# Patient Record
Sex: MALE | Race: WHITE | Employment: FULL TIME | URBAN - METROPOLITAN AREA
[De-identification: names, ages, dates, MRNs, and addresses within clinical notes are randomized per-mention and may not be internally consistent; named-entity substitution may affect disease eponyms.]

---

## 2020-05-18 ENCOUNTER — HOSPITAL ENCOUNTER (EMERGENCY)
Age: 40
Discharge: HOME | End: 2020-05-18
Attending: EMERGENCY MEDICINE
Payer: SELF-PAY

## 2020-05-18 VITALS
RESPIRATION RATE: 12 BRPM | TEMPERATURE: 97.8 F | OXYGEN SATURATION: 99 % | HEART RATE: 82 BPM | SYSTOLIC BLOOD PRESSURE: 168 MMHG | DIASTOLIC BLOOD PRESSURE: 99 MMHG

## 2020-05-18 DIAGNOSIS — F11.10 HEROIN ABUSE (HCC): ICD-10-CM

## 2020-05-18 DIAGNOSIS — T50.904A DRUG OVERDOSE, UNDETERMINED INTENT, INITIAL ENCOUNTER: Primary | ICD-10-CM

## 2020-05-18 LAB
AMPHET UR QL SCN: POSITIVE
BARBITURATES UR QL SCN: NEGATIVE
BENZODIAZ UR QL: POSITIVE
CANNABINOIDS UR QL SCN: POSITIVE
COCAINE UR QL SCN: NEGATIVE
DRUG SCRN COMMENT,DRGCM: ABNORMAL
GLUCOSE BLD STRIP.AUTO-MCNC: 99 MG/DL (ref 65–100)
METHADONE UR QL: NEGATIVE
OPIATES UR QL: NEGATIVE
PCP UR QL: NEGATIVE
SERVICE CMNT-IMP: NORMAL

## 2020-05-18 PROCEDURE — 94640 AIRWAY INHALATION TREATMENT: CPT

## 2020-05-18 PROCEDURE — 82962 GLUCOSE BLOOD TEST: CPT

## 2020-05-18 PROCEDURE — 74011000250 HC RX REV CODE- 250: Performed by: EMERGENCY MEDICINE

## 2020-05-18 PROCEDURE — 74011250636 HC RX REV CODE- 250/636: Performed by: EMERGENCY MEDICINE

## 2020-05-18 PROCEDURE — 99285 EMERGENCY DEPT VISIT HI MDM: CPT

## 2020-05-18 PROCEDURE — 80307 DRUG TEST PRSMV CHEM ANLYZR: CPT

## 2020-05-18 RX ORDER — NALOXONE HYDROCHLORIDE 4 MG/.1ML
SPRAY NASAL
Qty: 1 EACH | Refills: 0 | Status: SHIPPED | OUTPATIENT
Start: 2020-05-18

## 2020-05-18 RX ORDER — NALOXONE HYDROCHLORIDE 1 MG/ML
1 INJECTION INTRAMUSCULAR; INTRAVENOUS; SUBCUTANEOUS
Status: COMPLETED | OUTPATIENT
Start: 2020-05-18 | End: 2020-05-18

## 2020-05-18 RX ORDER — NALOXONE HYDROCHLORIDE 1 MG/ML
0.5 INJECTION INTRAMUSCULAR; INTRAVENOUS; SUBCUTANEOUS
Status: DISCONTINUED | OUTPATIENT
Start: 2020-05-18 | End: 2020-05-18

## 2020-05-18 RX ORDER — AMMONIA 15 % (W/V)
1 AMPUL (EA) INHALATION
Status: COMPLETED | OUTPATIENT
Start: 2020-05-18 | End: 2020-05-18

## 2020-05-18 RX ADMIN — AMMONIA INHALANTS 1 AMPULE: 0.04 INHALANT RESPIRATORY (INHALATION) at 15:07

## 2020-05-18 RX ADMIN — NALOXONE HYDROCHLORIDE 1 MG: 1 INJECTION PARENTERAL at 14:27

## 2020-05-18 NOTE — ED NOTES
Pt noted to be speaking in short sentences with [facility] police at bedside. Police report pt will go to senior care after discharge.

## 2020-05-18 NOTE — ED NOTES
Tech attempted to ambulate pt at MD request. Pt noted to be unsteady on his feet, unable to ambulate without assistance. Pt reports his legs feel like jello. Pt assisted back to bed and provided with sandwich. MD made aware.

## 2020-05-18 NOTE — DISCHARGE INSTRUCTIONS
Patient Education        Opioid Overdose: Care Instructions  Your Care Instructions    You have had treatment to help your body recover from taking too much of an opioid. You are getting better, but you may not feel well for a while. It takes time for the opioids to leave your body. How long it takes to feel better depends on which drug you took and how much you took of it. Opioids include illegal drugs such as heroin, often called smack, junk, H, and ska. Opioids also include medicines that doctors prescribe to treat pain. These are medicines such as oxycodone, methadone, and buprenorphine. They are sometimes sold and used illegally. Taking too much of an opioid can be dangerous. It may cause:  · Trouble breathing. · Low blood pressure. · A low heart rate. · A coma. When the doctor treated you for the overdose, he or she may have:  · Watched your symptoms or done tests to find out what kind of drug you took. · Given you fluids. · Given you oxygen to help you breathe. · Given you a medicine called naloxone to help reverse the effects of the opioid. · Done several tests, including blood tests, to see how you're responding to treatment. The doctor also watched you carefully to make sure you were recovering safely. Follow-up care is a key part of your treatment and safety. Be sure to make and go to all appointments, and call your doctor if you are having problems. It's also a good idea to know your test results and keep a list of the medicines you take. How can you care for yourself at home? · If you take opioids regularly, your body gets used to them. This is called physical dependence. If you are physically dependent on opioids, you may have withdrawal symptoms when you stop using them or use less. These symptoms can include nausea, sweating, chills, diarrhea, stomach cramps, and muscle aches. Withdrawal can last up to several weeks, depending on which opioid you took and how long you took it.  You may feel very ill, but you probably aren't in medical danger. · Your doctor may give you medicine to help you feel better. To help you get through withdrawal, you can also:  ? Get plenty of rest.  ? Drink plenty of fluids. ? Stay active. ? Eat a healthy diet. · If you had a tube in your throat to help you breathe, you may have a sore throat or hoarseness that can last a few days. Sip liquids to help soothe your throat. · Do not drink alcohol or take illegal drugs. · Do not take medicines that make you tired, like sleeping pills or muscle relaxers. · Do not drive if you feel sleepy or groggy while you recover from an overdose. · Get help to stop using opioids. Talk to your doctor about substance use treatment programs. · Talk to your doctor or pharmacist about having a naloxone rescue kit on hand. When should you call for help? Call 911 anytime you think you may need emergency care. For example, call if:    · You feel you cannot stop from hurting yourself or someone else.   [facility] your doctor now or seek immediate medical care if:    · You have new or worse withdrawal symptoms, such as:  ? Stomach cramps. ? Vomiting. ? Diarrhea. ? Muscle aches. ? Sweating.    Watch closely for changes in your health, and be sure to contact your doctor if:    · You do not get better as expected. Where can you learn more? Go to http://shun-willard.info/  Enter Z171 in the search box to learn more about \"Opioid Overdose: Care Instructions. \"  Current as of: August 21, 2019Content Version: 12.4  © 5549-2051 Healthwise, Incorporated. Care instructions adapted under license by [employer] (which disclaims liability or warranty for this information). If you have questions about a medical condition or this instruction, always ask your healthcare professional. [address] any warranty or liability for your use of this information.          Patient Education        Use of Multiple Drugs: Care Instructions  Your Care Instructions    You have had treatment to help your body get rid of a combination of any of these drugs:  · Prescription medicines  · Over-the-counter medicines  · Alcohol  · Illegal drugs  Taking some drugs together may cause a bad reaction. They can have unexpected or stronger effects on your body and mind. For example, benzodiazepines (such as alprazolam and lorazepam) and alcohol both depress the nervous system. Taken together, each one is stronger than when it is taken by itself. You are getting better, but it takes time for the drugs to leave your body. It may take up to 2 weeks for your mind to clear and your mood to improve. Depending on the drugs you took, the doctor might have:  · Watched your symptoms or done tests to find out what drugs were in your body. · Treated you to control your breathing, blood pressure, and heart rate. · Tried to remove the drugs from your body by pumping your stomach or giving you a substance by mouth that absorbs chemicals. · Given you a substance that neutralizes chemicals (antidote). · Given you oxygen to help you breathe. · Given you fluids. The doctor also watched you carefully to make sure you were recovering safely. Follow-up care is a key part of your treatment and safety. Be sure to make and go to all appointments, and call your doctor if you are having problems. It's also a good idea to know your test results and keep a list of the medicines you take. How can you care for yourself at home? · Adopt healthy habits to ease withdrawal symptoms. When you use substances like alcohol and some drugs regularly, your body gets used to them. This is called physical dependence. If you are physically dependent on substances, you may have withdrawal symptoms when you stop taking them. These symptoms may include trembling, feeling restless, and sweating.  To help get past these:  ? Get plenty of rest.  ? Drink lots of fluids. ? Stay active. ? Eat a healthy diet. · Drink fluids to soothe a sore throat. If you had a tube in your throat to help you breathe, you may have a sore throat or hoarseness that can last a few days. Drinking fluids may help. · If you use opioids, ask your doctor or pharmacist about having a naloxone rescue kit on hand. · Get help to stop using drugs. Talk to your doctor about substance use treatment programs. When should you call for help? Call 911 anytime you think you may need emergency care. For example, call if:    · You feel you cannot stop from hurting yourself or someone else.   [facility] your doctor now or seek immediate medical care if:    · You have new or worse symptoms of withdrawal, such as trembling, feeling restless, and sweating, that you can't manage at home.    Watch closely for changes in your health, and be sure to contact your doctor if:    · You do not get better as expected.     · You need help finding the right place to get help with drug or alcohol problems. Where can you learn more? Go to http://shunSierra Atlanticwillard.info/  Enter B109 in the search box to learn more about \"Use of Multiple Drugs: Care Instructions. \"  Current as of: August 21, 2019Content Version: 12.4  © 2913-4546 Healthwise, Incorporated. Care instructions adapted under license by [employer] (which disclaims liability or warranty for this information). If you have questions about a medical condition or this instruction, always ask your healthcare professional. [place] [address] any warranty or liability for your use of this information. Patient Education        Learning About Naloxone for Opioid Overdose  What is naloxone? Opioids are strong pain medicines. Examples include hydrocodone, oxycodone, fentanyl, and morphine. Heroin is an example of an illegal opioid. Taking too much of an opioid can cause death. An overdose is an emergency.   Naloxone is a medicine that reverses the effects of an overdose. If you take it soon enough after an overdose, it can save your life. Naloxone comes in a rescue kit you can carry with you. You may hear it called a Narcan kit for short. The rescue kit may contain:  · The medicine. · Syringes and needles. · A nasal adapter for the syringes. · A separate nasal spray device. Your doctor can give you a prescription for a rescue kit and show you how to use it. In some places you can buy Narcan kits without a prescription. When is naloxone used? Naloxone is used when a person shows signs of an opioid overdose. A person may have overdosed if he or she is:  · Sleepy or hard to wake up. · Confused. · Not breathing normally. Make sure your family and friends know about these signs of an overdose. If someone appears to have overdosed, call 911. A drug overdose is an emergency. How do you use it? If you overdose, you may not be able to give yourself the medicine. So it's very important that your friends and family know how and when to give it to you. Rescue kits come with instructions. There are two ways to give the medicine. Many rescue kits can be used for either method. The methods are:  · Injection with needle and syringe. ? Training may be needed in order to use this method correctly. ? Some rescue kits come with automatic injectors that don't require special training. ? The medicine can be injected through clothing. · Nasal spray. ? Many rescue kits come with a nasal adapter. It attaches to the syringe and turns the medicine into a mist.  ? The mist is sprayed into the nose of a person who has overdosed. The person needs to be lying down when the mist is sprayed. Overdose symptoms may return a few minutes after the first dose from the rescue kit. If that happens, a second dose is needed. Rescue kits come with two doses for that reason. Keep your rescue kit with you always. You never know when you might need it.   If you think you or someone else may have overdosed but you're not sure, use the kit anyway. Aside from going through withdrawal, which may be uncomfortable, there is no downside to using this medicine. Always go to the emergency room after using naloxone. Doctors will want to make sure the overdose has been reversed. Follow-up care is a key part of your treatment and safety. Be sure to make and go to all appointments, and call your doctor if you are having problems. It's also a good idea to know your test results and keep a list of the medicines you take. Where can you learn more? Go to http://shun-willard.info/  Enter Q404 in the search box to learn more about \"Learning About Naloxone for Opioid Overdose. \"  Current as of: August 21, 2019Content Version: 12.4  © 4946-9647 Healthwise, Incorporated. Care instructions adapted under license by [employer] (which disclaims liability or warranty for this information). If you have questions about a medical condition or this instruction, always ask your healthcare professional. [address] any warranty or liability for your use of this information.

## 2020-05-18 NOTE — ED NOTES
Bedside and Verbal shift change report given to Yamini Brock RN (oncoming nurse) by Hailey Ross RN (offgoing nurse). Report included the following information SBAR and ED Summary.

## 2020-05-18 NOTE — ED NOTES
Patient (s)  given copy of dc instructions and 1 paper script(s) and 0 electronic scripts. Patient (s)  verbalized understanding of instructions and script (s). Patient given a current medication reconciliation form and verbalized understanding of their medications. Patient (s) verbalized understanding of the importance of discussing medications with  his or her physician or clinic they will be following up with. Patient alert and oriented and in no acute distress. Patient offered wheelchair from treatment area to hospital entrance, patient denies wheelchair.

## 2020-05-18 NOTE — ED NOTES
Pt presents to ED via EMS for possible drug overdose. Per EMS, pt was found slumped over in the 's seat to a pickup truck. EMS report finding a bottle of 4mg Xanax pills and a rolled up dollar bill next to him. Police also found an unknown white powder, smoking pipes, and needles. Pt noted to be drowsy, difficult to arouse. Pt able to maintain own airway. Pt BP noted to be elevated, unknown if pt has hx of HTN. Pt able to answer yes/no questions or provide short answers before falling back asleep. Call bell in reach. Side rails x2      Emergency Department Nursing Plan of Care       The Nursing Plan of Care is developed from the Nursing assessment and Emergency Department Attending provider initial evaluation. The plan of care may be reviewed in the ED Provider note.     The Plan of Care was developed with the following considerations:   Patient / Family readiness to learn indicated by:verbalized understanding  Persons(s) to be included in education: police  Barriers to Learning/Limitations:No    Signed     Srinivas Collazo RN    5/18/2020   3:38 PM

## 2020-05-18 NOTE — ED NOTES
After using ammonia capsule, pt appeared to be more awake. Pt reports hx of drug abuse including meth, opiates, and xanax. Pt maintains that the only drugs he did today was xanax.

## 2020-05-18 NOTE — ED PROVIDER NOTES
EMERGENCY DEPARTMENT HISTORY AND PHYSICAL EXAM      Date: 5/18/2020  Patient Name: Carson Friend    History of Presenting Illness     Chief Complaint   Patient presents with    Drug Overdose     History Provided By: Patient and EMS    HPI: Carson Firend, 47 y.o. male with past medical history significant for GERD and migraines who presents via EMS to the ED with cc of overdose. Per police and EMS, patient was found in the front seat of a vehicle with a female passenger in the back minimally responsive. They found multiple hypodermic needles as Xanax tablets in the car. Patient is a known IV heroin user and denies taking any drugs today. He denies any current complaints. PMHx: GERD and migraines  Social Hx: Unknown    PCP: None    History and review of systems limited secondary to patient's level of alertness. No current facility-administered medications on file prior to encounter. Current Outpatient Medications on File Prior to Encounter   Medication Sig Dispense Refill    [DISCONTINUED] ibuprofen (MOTRIN) 600 mg tablet Take 1 Tab by mouth every six (6) hours as needed for Pain. 20 Tab 0    [DISCONTINUED] ondansetron hcl (ZOFRAN) 4 mg tablet Take 1 Tab by mouth every eight (8) hours as needed for Nausea. 20 Tab 0     Past History     Past Medical History:  Past Medical History:   Diagnosis Date    Gastrointestinal disorder     Stomach Ulcers    Ill-defined condition     Migranes     Past Surgical History:  Past Surgical History:   Procedure Laterality Date    HX HEENT      Nasal Septum    HX ORTHOPAEDIC      Exploratory Surgery on R Knee     Family History:  History reviewed. No pertinent family history. Social History:  Social History     Tobacco Use    Smoking status: Current Every Day Smoker     Packs/day: 0.50    Smokeless tobacco: Never Used   Substance Use Topics    Alcohol use: Not Currently    Drug use: Yes     Allergies:   Allergies   Allergen Reactions    Acetaminophen Shortness of Breath and Swelling     Review of Systems   Review of Systems   Unable to perform ROS: Acuity of condition     Physical Exam   Physical Exam  Vitals signs and nursing note reviewed. Constitutional:       Appearance: Normal appearance. He is well-developed. Comments: Arousable but drowsy   HENT:      Head: Normocephalic and atraumatic. Neck:      Musculoskeletal: Full passive range of motion without pain, normal range of motion and neck supple. Cardiovascular:      Rate and Rhythm: Normal rate and regular rhythm. Pulses: Normal pulses. Heart sounds: Normal heart sounds. No murmur. Pulmonary:      Effort: Pulmonary effort is normal. No respiratory distress. Breath sounds: Normal breath sounds. Comments: Protecting his own airway  Chest:      Chest wall: No tenderness. Abdominal:      General: Bowel sounds are normal.      Palpations: Abdomen is soft. Tenderness: There is no abdominal tenderness. There is no guarding or rebound. Skin:     General: Skin is warm and dry. Findings: No erythema or rash. Neurological:      Mental Status: He is lethargic. Comments: Drowsy but arousable, moves all extremities spontaneously, minimally answers questions and then falls back asleep   Psychiatric:         Speech: Speech normal.         Behavior: Behavior normal.         Thought Content: Thought content normal.         Judgment: Judgment normal.       Diagnostic Study Results   Labs -     Recent Results (from the past 12 hour(s))   GLUCOSE, POC    Collection Time: 05/18/20  1:47 PM   Result Value Ref Range    Glucose (POC) 99 65 - 100 mg/dL    Performed by [facility] EDT        Radiologic Studies -   No orders to display     No results found. Medical Decision Making   I am the first provider for this patient. I reviewed the vital signs, available nursing notes, past medical history, past surgical history, family history and social history.     Vital Signs-Reviewed the patient's vital signs. Patient Vitals for the past 24 hrs:   Temp Pulse Resp BP SpO2   05/18/20 1500  81 13 (!) 176/113 99 %   05/18/20 1430  78 19 (!) 188/119 99 %   05/18/20 1415  74 18 (!) 210/107 99 %   05/18/20 1341 97.8 °F (36.6 °C) 85 14 (!) 186/113 99 %     Pulse Oximetry Analysis - 99% on RA    Cardiac Monitor:   Rate: 82 bpm  Rhythm: Normal Sinus Rhythm     Records Reviewed: Nursing Notes    Provider Notes (Medical Decision Making):   79-year-old male brought in by EMS for a suspected overdose. He is drowsy but arousable and he is protecting his own airway. Unknown whether patient takes Xanax on a regular basis so I will not give him flumazenil to reverse it. I will give him IV Narcan to see if he becomes more arousable. Otherwise, will observe in the emergency department for 2 hours and if he is still protecting his own airway, plan for discharge from the emergency department. ED Course:   Initial assessment performed. The patients presenting problems have been discussed, and they are in agreement with the care plan formulated and outlined with them. I have encouraged them to ask questions as they arise throughout their visit. Progress Note  2:16 PM  I have re-evaluated pt and nursing has been unable to obtain IV access x2. We will change his IV Narcan to intranasal Narcan and continue to monitor. Progress Note  3:23 PM  I have re-evaluated pt and he is more alert after an ammonia inhalant. He admits to using meth and heroin today. He is still mildly drowsy I will sign him out to Dr. Margarita Dia. Once he is able to tolerate fluids and more awake, he can be discharged. I will write him a prescription for Narcan and give him outpatient resources for follow-up.     ED Course as of May 19 1346   Mon May 18, 2020   1607 Pt reassessed; sleepy but easily arousable, stable vitals, will continue to allow patient to metabolize, will PO challenge and ambulate prior to discharge [HW]   2256 Pt waking up, able to give urine sample and tolerate PO. Will ambulate patient. Pt denying to me any SI, HI or other acute psychiatric concerns. [HW]   1919 Pt awake, oriented x 4, stated that he took Xanax and Suboxone today, no intent to harm himself, has no complaints and states he's ready to be discharged. Pt tolerated sandwich and ambulated around ED without issues. [HW]   1920 The pt is ready for discharge. The pt's signs, symptoms, diagnosis, and discharge instructions have been discussed and pt has conveyed their understanding. The pt is to follow up as recommended or return to ER should their symptoms worsen. Plan has been discussed and pt is in agreement. [HW]      ED Course User Index  [HW] Lauren Guerra MD       Progress Note:   Updated pt on all returned results and findings. Discussed the importance of proper follow up as referred below along with return precautions. Pt in agreement with the care plan and expresses agreement with and understanding of all items discussed. Disposition:  Discharge Note:  The pt is ready for discharge. The pt's signs, symptoms, diagnosis, and discharge instructions have been discussed and pt has conveyed their understanding. The pt is to follow up as recommended or return to ER should their symptoms worsen. Plan has been discussed and pt is in agreement. PLAN:  1. Current Discharge Medication List      START taking these medications    Details   naloxone (Narcan) 4 mg/actuation nasal spray Use 1 spray intranasally, then discard. Repeat with new spray every 2 min as needed for opioid overdose symptoms, alternating nostrils. Qty: 1 Each, Refills: 0           2.    Follow-up Information     Follow up With Specialties Details Why [id number]  Schedule an appointment as soon as possible for a visit  Kalyani Gomez [id number] [phone]    [facility] EMERGENCY DEPT Emergency Medicine  As needed, If symptoms worsen [address] [place]  [phone]        Return to ED if worse     Diagnosis     Clinical Impression:   1. Drug overdose, undetermined intent, initial encounter    2. Heroin abuse ([facility] [address]            Please note that this dictation was completed with Dragon, computer voice recognition software. Quite often unanticipated grammatical, syntax, homophones, and other interpretive errors are inadvertently transcribed by the computer software. Please disregard these errors. Additionally, please excuse any errors that have escaped final proofreading.